# Patient Record
Sex: FEMALE | Race: WHITE | NOT HISPANIC OR LATINO
[De-identification: names, ages, dates, MRNs, and addresses within clinical notes are randomized per-mention and may not be internally consistent; named-entity substitution may affect disease eponyms.]

---

## 2018-05-16 ENCOUNTER — TRANSCRIPTION ENCOUNTER (OUTPATIENT)
Age: 48
End: 2018-05-16

## 2022-01-10 ENCOUNTER — OUTPATIENT (OUTPATIENT)
Dept: OUTPATIENT SERVICES | Facility: HOSPITAL | Age: 52
LOS: 1 days | End: 2022-01-10
Payer: COMMERCIAL

## 2022-01-10 DIAGNOSIS — Z20.828 CONTACT WITH AND (SUSPECTED) EXPOSURE TO OTHER VIRAL COMMUNICABLE DISEASES: ICD-10-CM

## 2022-01-10 LAB — SARS-COV-2 RNA SPEC QL NAA+PROBE: SIGNIFICANT CHANGE UP

## 2022-01-10 PROCEDURE — U0005: CPT

## 2022-01-10 PROCEDURE — C9803: CPT

## 2022-01-10 PROCEDURE — U0003: CPT

## 2022-01-11 DIAGNOSIS — Z20.828 CONTACT WITH AND (SUSPECTED) EXPOSURE TO OTHER VIRAL COMMUNICABLE DISEASES: ICD-10-CM

## 2022-07-26 ENCOUNTER — EMERGENCY (EMERGENCY)
Facility: HOSPITAL | Age: 52
LOS: 0 days | Discharge: HOME | End: 2022-07-26
Attending: EMERGENCY MEDICINE
Payer: COMMERCIAL

## 2022-07-26 VITALS — HEIGHT: 61 IN | WEIGHT: 147.05 LBS

## 2022-07-26 VITALS
DIASTOLIC BLOOD PRESSURE: 78 MMHG | SYSTOLIC BLOOD PRESSURE: 157 MMHG | RESPIRATION RATE: 18 BRPM | HEART RATE: 66 BPM | OXYGEN SATURATION: 100 % | TEMPERATURE: 98 F

## 2022-07-26 DIAGNOSIS — R10.10 UPPER ABDOMINAL PAIN, UNSPECIFIED: ICD-10-CM

## 2022-07-26 DIAGNOSIS — K52.9 NONINFECTIVE GASTROENTERITIS AND COLITIS, UNSPECIFIED: ICD-10-CM

## 2022-07-26 DIAGNOSIS — Z90.49 ACQUIRED ABSENCE OF OTHER SPECIFIED PARTS OF DIGESTIVE TRACT: ICD-10-CM

## 2022-07-26 DIAGNOSIS — K59.00 CONSTIPATION, UNSPECIFIED: ICD-10-CM

## 2022-07-26 DIAGNOSIS — R42 DIZZINESS AND GIDDINESS: ICD-10-CM

## 2022-07-26 DIAGNOSIS — Z90.49 ACQUIRED ABSENCE OF OTHER SPECIFIED PARTS OF DIGESTIVE TRACT: Chronic | ICD-10-CM

## 2022-07-26 LAB
ALBUMIN SERPL ELPH-MCNC: 4 G/DL — SIGNIFICANT CHANGE UP (ref 3.3–5)
ALP SERPL-CCNC: 58 U/L — SIGNIFICANT CHANGE UP (ref 40–120)
ALT FLD-CCNC: 34 U/L — SIGNIFICANT CHANGE UP (ref 12–78)
ANION GAP SERPL CALC-SCNC: 5 MMOL/L — SIGNIFICANT CHANGE UP (ref 5–17)
APPEARANCE UR: CLEAR — SIGNIFICANT CHANGE UP
AST SERPL-CCNC: 17 U/L — SIGNIFICANT CHANGE UP (ref 15–37)
BASOPHILS # BLD AUTO: 0.03 K/UL — SIGNIFICANT CHANGE UP (ref 0–0.2)
BASOPHILS NFR BLD AUTO: 0.5 % — SIGNIFICANT CHANGE UP (ref 0–2)
BILIRUB SERPL-MCNC: 0.5 MG/DL — SIGNIFICANT CHANGE UP (ref 0.2–1.2)
BILIRUB UR-MCNC: NEGATIVE — SIGNIFICANT CHANGE UP
BUN SERPL-MCNC: 15 MG/DL — SIGNIFICANT CHANGE UP (ref 7–23)
CALCIUM SERPL-MCNC: 9.3 MG/DL — SIGNIFICANT CHANGE UP (ref 8.5–10.1)
CHLORIDE SERPL-SCNC: 108 MMOL/L — SIGNIFICANT CHANGE UP (ref 96–108)
CO2 SERPL-SCNC: 27 MMOL/L — SIGNIFICANT CHANGE UP (ref 22–31)
COLOR SPEC: YELLOW — SIGNIFICANT CHANGE UP
CREAT SERPL-MCNC: 0.76 MG/DL — SIGNIFICANT CHANGE UP (ref 0.5–1.3)
DIFF PNL FLD: NEGATIVE — SIGNIFICANT CHANGE UP
EGFR: 95 ML/MIN/1.73M2 — SIGNIFICANT CHANGE UP
EOSINOPHIL # BLD AUTO: 0.02 K/UL — SIGNIFICANT CHANGE UP (ref 0–0.5)
EOSINOPHIL NFR BLD AUTO: 0.3 % — SIGNIFICANT CHANGE UP (ref 0–6)
GLUCOSE SERPL-MCNC: 95 MG/DL — SIGNIFICANT CHANGE UP (ref 70–99)
GLUCOSE UR QL: NEGATIVE — SIGNIFICANT CHANGE UP
HCG SERPL-ACNC: <1 MIU/ML — SIGNIFICANT CHANGE UP
HCT VFR BLD CALC: 43.3 % — SIGNIFICANT CHANGE UP (ref 34.5–45)
HGB BLD-MCNC: 15 G/DL — SIGNIFICANT CHANGE UP (ref 11.5–15.5)
HIV 1 & 2 AB SERPL IA.RAPID: SIGNIFICANT CHANGE UP
IMM GRANULOCYTES NFR BLD AUTO: 0.2 % — SIGNIFICANT CHANGE UP (ref 0–1.5)
KETONES UR-MCNC: ABNORMAL
LEUKOCYTE ESTERASE UR-ACNC: ABNORMAL
LIDOCAIN IGE QN: 197 U/L — SIGNIFICANT CHANGE UP (ref 73–393)
LYMPHOCYTES # BLD AUTO: 2.55 K/UL — SIGNIFICANT CHANGE UP (ref 1–3.3)
LYMPHOCYTES # BLD AUTO: 44.3 % — HIGH (ref 13–44)
MCHC RBC-ENTMCNC: 30.2 PG — SIGNIFICANT CHANGE UP (ref 27–34)
MCHC RBC-ENTMCNC: 34.6 GM/DL — SIGNIFICANT CHANGE UP (ref 32–36)
MCV RBC AUTO: 87.1 FL — SIGNIFICANT CHANGE UP (ref 80–100)
MONOCYTES # BLD AUTO: 0.29 K/UL — SIGNIFICANT CHANGE UP (ref 0–0.9)
MONOCYTES NFR BLD AUTO: 5 % — SIGNIFICANT CHANGE UP (ref 2–14)
NEUTROPHILS # BLD AUTO: 2.86 K/UL — SIGNIFICANT CHANGE UP (ref 1.8–7.4)
NEUTROPHILS NFR BLD AUTO: 49.7 % — SIGNIFICANT CHANGE UP (ref 43–77)
NITRITE UR-MCNC: NEGATIVE — SIGNIFICANT CHANGE UP
PH UR: 5 — SIGNIFICANT CHANGE UP (ref 5–8)
PLATELET # BLD AUTO: 287 K/UL — SIGNIFICANT CHANGE UP (ref 150–400)
POTASSIUM SERPL-MCNC: 4 MMOL/L — SIGNIFICANT CHANGE UP (ref 3.5–5.3)
POTASSIUM SERPL-SCNC: 4 MMOL/L — SIGNIFICANT CHANGE UP (ref 3.5–5.3)
PROT SERPL-MCNC: 7.8 GM/DL — SIGNIFICANT CHANGE UP (ref 6–8.3)
PROT UR-MCNC: NEGATIVE — SIGNIFICANT CHANGE UP
RBC # BLD: 4.97 M/UL — SIGNIFICANT CHANGE UP (ref 3.8–5.2)
RBC # FLD: 12 % — SIGNIFICANT CHANGE UP (ref 10.3–14.5)
SODIUM SERPL-SCNC: 140 MMOL/L — SIGNIFICANT CHANGE UP (ref 135–145)
SP GR SPEC: 1.01 — SIGNIFICANT CHANGE UP (ref 1.01–1.02)
UROBILINOGEN FLD QL: NEGATIVE — SIGNIFICANT CHANGE UP
WBC # BLD: 5.76 K/UL — SIGNIFICANT CHANGE UP (ref 3.8–10.5)
WBC # FLD AUTO: 5.76 K/UL — SIGNIFICANT CHANGE UP (ref 3.8–10.5)

## 2022-07-26 PROCEDURE — 85025 COMPLETE CBC W/AUTO DIFF WBC: CPT

## 2022-07-26 PROCEDURE — 86703 HIV-1/HIV-2 1 RESULT ANTBDY: CPT

## 2022-07-26 PROCEDURE — 74177 CT ABD & PELVIS W/CONTRAST: CPT | Mod: 26,MA

## 2022-07-26 PROCEDURE — 99284 EMERGENCY DEPT VISIT MOD MDM: CPT | Mod: 25

## 2022-07-26 PROCEDURE — 84702 CHORIONIC GONADOTROPIN TEST: CPT

## 2022-07-26 PROCEDURE — 81001 URINALYSIS AUTO W/SCOPE: CPT

## 2022-07-26 PROCEDURE — 87086 URINE CULTURE/COLONY COUNT: CPT

## 2022-07-26 PROCEDURE — 99285 EMERGENCY DEPT VISIT HI MDM: CPT

## 2022-07-26 PROCEDURE — 80053 COMPREHEN METABOLIC PANEL: CPT

## 2022-07-26 PROCEDURE — 83690 ASSAY OF LIPASE: CPT

## 2022-07-26 PROCEDURE — 36415 COLL VENOUS BLD VENIPUNCTURE: CPT

## 2022-07-26 PROCEDURE — 74177 CT ABD & PELVIS W/CONTRAST: CPT | Mod: MA

## 2022-07-26 RX ORDER — SODIUM CHLORIDE 9 MG/ML
1000 INJECTION INTRAMUSCULAR; INTRAVENOUS; SUBCUTANEOUS ONCE
Refills: 0 | Status: COMPLETED | OUTPATIENT
Start: 2022-07-26 | End: 2022-07-26

## 2022-07-26 RX ADMIN — SODIUM CHLORIDE 2000 MILLILITER(S): 9 INJECTION INTRAMUSCULAR; INTRAVENOUS; SUBCUTANEOUS at 14:11

## 2022-07-26 NOTE — ED STATDOCS - CPE ED NEURO NORM
normal... ,[email or web address]

## 2022-07-26 NOTE — ED ADULT TRIAGE NOTE - CHIEF COMPLAINT QUOTE
Patient comes to ED for generalized abdominal pain. Patient denies any nausea vomiting or diarrhea. Patient reports similar pain happened a week ago and resolved and pain came back this afternoon,

## 2022-07-26 NOTE — ED STATDOCS - PATIENT PORTAL LINK FT
You can access the FollowMyHealth Patient Portal offered by [facility] by registering at the following website: [email or web address]. By joining [employer]’s FollowMyHealth portal, you will also be able to view your health information using other applications (apps) compatible with our system.

## 2022-07-26 NOTE — ED STATDOCS - OBJECTIVE STATEMENT
73 y/o female w/ a PMHx of cholecystectomy presents to the ED c/o upper abd pain. Pt also c/o lightheadedness Denies n/v or urinary Sx. Pt states pain is currently improved. Pt also reports she had a similar pain x1 week ago which self resolved. LMP x2 months ago. Nonsmoker. Pt doesn't think she could be pregnant. Denies fevers.

## 2022-07-26 NOTE — ED STATDOCS - NSFOLLOWUPINSTRUCTIONS_ED_ALL_ED_FT
Enteritis    WHAT YOU NEED TO KNOW:    Enteritis is inflammation of the small intestine. It may be caused by eating foods or drinking liquids contaminated with a virus, bacteria, or parasites. It may also be caused by certain medicines, damage from radiation, and medical conditions such as Crohn disease.     DISCHARGE INSTRUCTIONS:    Seek care immediately if:   •You cannot stop vomiting.      •You have not urinated for 12 hours.       Contact your healthcare provider if:   •You have a fever over 101.5.       •You have blood or mucus in your bowel movements.       •You continue to vomit or have diarrhea for more than 3 days, even after treatment.      •You have a dry mouth and eyes, you are urinating less than usual, and you feel dizzy when you stand up.       •Your mouth or eyes are dry. You are not urinating as much or as often.      •You are losing weight without trying.      •You have questions or concerns about your condition or care.      Medicines:   •Medicines may be given to fight an infection caused by bacteria or a parasite. You may also need medicines to slow or stop your diarrhea or vomiting. Do not take these medicines unless your healthcare provider say it is okay. Other medicines may be needed to treat medical conditions that are causing enteritis.       •Take your medicine as directed. Contact your healthcare provider if you think your medicine is not helping or if you have side effects. Tell him or her if you are allergic to any medicine. Keep a list of the medicines, vitamins, and herbs you take. Include the amounts, and when and why you take them. Bring the list or the pill bottles to follow-up visits. Carry your medicine list with you in case of an emergency.      Manage enteritis:   •Eat foods that help to decrease symptoms. Limit or avoid foods and liquids that are high in sugar, fat, and fiber to help relieve diarrhea. It may be helpful to avoid lactose. Lactose is a type of sugar that is found in milk products. You may be able to tolerate soups, broths, well-cooked vegetables, canned fruit, and baked or broiled meats. Ask your dietitian or healthcare provider if you should follow a special diet. You may need to avoid other foods if you have certain medical conditions such as celiac disease.       •Drink liquids as directed. Ask how much liquid to drink each day and which liquids are best for you. It is important to prevent or treat dehydration. Even if you have been vomiting, suck on ice chips or take small sips of clear liquids often. Slowly increase the amount of clear liquids you drink. If you become dehydrated, you may need IV liquids.      •Drink an oral rehydration solution (ORS) as directed. An ORS contains water, salts, and sugar that are needed to replace lost body fluids. Ask what kind of ORS to use, how much to drink, and where to get it.      Prevent enteritis: Enteritis that is caused by bacteria, parasites, or viruses can be prevented. The following may help to prevent this type of enteritis:  •Wash your hands often. Use soap and water. Wash your hands after you use the bathroom, change a child's diapers, or sneeze. Wash your hands before you prepare or eat food.   Handwashing           •Clean surfaces and do laundry often. Wash your clothes and towels separately from the rest of the laundry. Clean surfaces in your home with antibacterial  or bleach.      •Clean food thoroughly and cook safely. Wash raw vegetables before you cook. Cook meat, fish, and eggs fully. Do not use the same dishes for raw meat as you do for other foods. Refrigerate any leftover food immediately.      •Be aware when you camp or travel. Drink only clean water. Do not drink from rivers or lakes unless you purify or boil the water first. When you travel, drink bottled water and do not add ice. Do not eat fruit that has not been peeled. Do not eat raw fish or meat that is not fully cooked.       Follow up with your doctor as directed: Write down your questions so you remember to ask them during your visits.

## 2022-07-26 NOTE — ED STATDOCS - PROGRESS NOTE DETAILS
75 yo female with a PSH of cholecystectomy presents with upper abdominal pain. Pt had the pain last week which resolved after a few days. The pain returned this morning with lightheadedness. Denies n/v/d/f, urinary complaints. Will check labs, CT, UA and reeval. -Mynor Marin PA-C Discussed results with pt including te enteritis and stool burden. advised that fluids and nsaids are the treatment for enteritis and to make sure to continue to eat fruits (except bananas), vegetables to prevent constipation. Pt aware. Will give GI referral for further evaluation. Pt aware and agrees with plan. -Mynor Marin PA-C

## 2022-07-26 NOTE — ED STATDOCS - ATTENDING APP SHARED VISIT CONTRIBUTION OF CARE
I, Heath Ron MD,  performed the initial face to face bedside interview with this patient regarding history of present illness, review of symptoms and relevant past medical, social and family history.  I completed an independent physical examination.  I was the initial provider who evaluated this patient.   I personally saw the patient and performed a substantive portion of the visit including all aspects of the medical decision making.  I have signed out the follow up of any pending tests (i.e. labs, radiological studies) to the JOSE.  I have communicated the patient’s plan of care and disposition with the JOSE.  The history, relevant review of systems, past medical and surgical history, medical decision making, and physical examination was documented by the scribe in my presence and I attest to the accuracy of the documentation.

## 2022-07-26 NOTE — ED STATDOCS - CARE PROVIDER_API CALL
Dane Marie  GASTROENTEROLOGY  [address], [address]  [place] [address]  Phone: [phone]  Fax: [phone]  Follow Up Time:

## 2022-07-27 LAB
CULTURE RESULTS: SIGNIFICANT CHANGE UP
SPECIMEN SOURCE: SIGNIFICANT CHANGE UP

## 2023-10-30 PROBLEM — Z78.9 OTHER SPECIFIED HEALTH STATUS: Chronic | Status: ACTIVE | Noted: 2022-07-28

## 2023-11-13 ENCOUNTER — LABORATORY RESULT (OUTPATIENT)
Age: 53
End: 2023-11-13

## 2023-11-13 ENCOUNTER — APPOINTMENT (OUTPATIENT)
Dept: OBGYN | Facility: CLINIC | Age: 53
End: 2023-11-13
Payer: COMMERCIAL

## 2023-11-13 VITALS
BODY MASS INDEX: 24.55 KG/M2 | HEIGHT: 61 IN | WEIGHT: 130 LBS | SYSTOLIC BLOOD PRESSURE: 115 MMHG | DIASTOLIC BLOOD PRESSURE: 76 MMHG

## 2023-11-13 DIAGNOSIS — Z80.0 FAMILY HISTORY OF MALIGNANT NEOPLASM OF DIGESTIVE ORGANS: ICD-10-CM

## 2023-11-13 DIAGNOSIS — Z80.3 FAMILY HISTORY OF MALIGNANT NEOPLASM OF BREAST: ICD-10-CM

## 2023-11-13 DIAGNOSIS — Z00.00 ENCOUNTER FOR GENERAL ADULT MEDICAL EXAMINATION W/OUT ABNORMAL FINDINGS: ICD-10-CM

## 2023-11-13 DIAGNOSIS — Z78.9 OTHER SPECIFIED HEALTH STATUS: ICD-10-CM

## 2023-11-13 PROCEDURE — 99386 PREV VISIT NEW AGE 40-64: CPT | Mod: 1L

## 2023-11-13 PROCEDURE — 82270 OCCULT BLOOD FECES: CPT

## 2023-11-13 PROCEDURE — 99202 OFFICE O/P NEW SF 15 MIN: CPT

## 2023-11-21 ENCOUNTER — NON-APPOINTMENT (OUTPATIENT)
Age: 53
End: 2023-11-21

## 2023-12-01 ENCOUNTER — ASOB RESULT (OUTPATIENT)
Age: 53
End: 2023-12-01

## 2023-12-01 ENCOUNTER — APPOINTMENT (OUTPATIENT)
Dept: ANTEPARTUM | Facility: CLINIC | Age: 53
End: 2023-12-01
Payer: COMMERCIAL

## 2023-12-01 PROCEDURE — 76830 TRANSVAGINAL US NON-OB: CPT

## 2023-12-01 PROCEDURE — 76856 US EXAM PELVIC COMPLETE: CPT | Mod: 59

## 2023-12-15 ENCOUNTER — APPOINTMENT (OUTPATIENT)
Dept: OBGYN | Facility: CLINIC | Age: 53
End: 2023-12-15
Payer: COMMERCIAL

## 2023-12-15 VITALS
DIASTOLIC BLOOD PRESSURE: 81 MMHG | BODY MASS INDEX: 24.55 KG/M2 | WEIGHT: 130 LBS | HEIGHT: 61 IN | SYSTOLIC BLOOD PRESSURE: 133 MMHG

## 2023-12-15 PROCEDURE — 99212 OFFICE O/P EST SF 10 MIN: CPT

## 2023-12-15 NOTE — DISCUSSION/SUMMARY
[FreeTextEntry1] : 54yo  PMF with cystocele & broad based cervical polyp.   Osteopenia:  - Encouraged daily weight bearing exercise, Ca2+ rich diet, continue Vit D as recommended by PCP  - Repeat test in 2 yrs   Cervical polyp:  - Will need LEEP procedure in [facility] in   - Aware she cannot have anything in vagina for 2 wks post procedure or be submerged in water  - All questions were solicited and answered   cystocele:  - Given info to see Dr Craig today   Fibroid:  - Repeat TVUS in 1 yr; sooner PRN   Taal Wiley MD  Obstetrician/Gynecologist

## 2023-12-15 NOTE — HISTORY OF PRESENT ILLNESS
[FreeTextEntry1] : 54yo  PMF with cystocele is here for cervical polypectomy and results review. Her pap and HPV are wnl. Her sono demonstrates a 3mm EMS, q 1cm fibroid, a normal R ov and non-visualized L ov. She had a DEXA which demonstrates osteopenia.

## 2024-01-03 ENCOUNTER — RESULT CHARGE (OUTPATIENT)
Age: 54
End: 2024-01-03

## 2024-01-04 ENCOUNTER — APPOINTMENT (OUTPATIENT)
Dept: UROGYNECOLOGY | Facility: CLINIC | Age: 54
End: 2024-01-04
Payer: COMMERCIAL

## 2024-01-04 DIAGNOSIS — N95.2 POSTMENOPAUSAL ATROPHIC VAGINITIS: ICD-10-CM

## 2024-01-04 DIAGNOSIS — N84.1 POLYP OF CERVIX UTERI: ICD-10-CM

## 2024-01-04 DIAGNOSIS — K59.09 OTHER CONSTIPATION: ICD-10-CM

## 2024-01-04 PROCEDURE — 57500 BIOPSY OF CERVIX: CPT | Mod: 59

## 2024-01-04 PROCEDURE — 99204 OFFICE O/P NEW MOD 45 MIN: CPT | Mod: 25

## 2024-01-04 PROCEDURE — 99459 PELVIC EXAMINATION: CPT

## 2024-01-04 PROCEDURE — 81003 URINALYSIS AUTO W/O SCOPE: CPT | Mod: QW

## 2024-01-04 RX ORDER — ESTRADIOL 0.1 MG/G
0.1 CREAM VAGINAL
Qty: 1 | Refills: 3 | Status: ACTIVE | COMMUNITY
Start: 2024-01-04 | End: 1900-01-01

## 2024-01-04 NOTE — ADDENDUM
[FreeTextEntry1] : This note was written by Jenny Benedict, acting as the  for Dr. Craig. This note accurately reflects the work and decisions made by Dr. Craig.

## 2024-01-04 NOTE — OB HISTORY
[Total Preg ___] : : [unfilled] [Vaginal ___] : [unfilled] vaginal delivery(s) [Last Pap Smear ___] : date of last pap smear was on [unfilled] [Sexually Active] : sexually active [Definite ___ (Date)] : the last menstrual period was [unfilled] [Abnormal Pap Smear] : normal pap smear [Taking Estrogens] : is not taking estrogen replacement

## 2024-01-04 NOTE — PROCEDURE
[Straight Catheterization] : insertion of a straight catheter [Patient] : the patient [___ Fr Straight Tip] : a [unfilled] in [place] straight tip catheter [None] : there were no complications with the catheter insertion [Clear] : clear [No Complications] : no complications [Tolerated Well] : the patient tolerated the procedure well [Post procedure instructions and information given] : Post procedure instructions and information were given and reviewed with patient. [0] : 0 [Other ___] : [unfilled] [Culture] : culture [Urinalysis] : urinalysis [FreeTextEntry1] : Excision of cervical polyp: Bilobed polyp was noted on involving the posterior lip of the cervix at 12 o'clock position.  The polyp was grasped with a ring forceps and was excised without difficulty. Specimen sent to pathology

## 2024-01-04 NOTE — PHYSICAL EXAM
[Chaperone Present] : A chaperone was present in the examining room during all aspects of the physical examination [FreeTextEntry1] : General: Not in acute distress, alert and oriented x3.   Neck: Supple. No lymphadenopathy.  Abdomen: Soft, nontender, and nondistended. No obvious hepatosplenomegaly. No obvious hernias.   Pelvic Exam: Normal external female genitalia. Saddle sensory exam S2 to S4 is intact. Perineal reflexes not visualized. Urethra is hypermobile without prolapse, exudates, or lesions. Cough stress test is negative with and without anterior vaginal wall reduction. Post void residual was checked with I/O cath and was 200 cc of clear urine. Pale and mildly atrophic-appearing vaginal epithelium. No vaginal blood or discharge. Cervix with a posterior lip polyp which was removed without difficulty.  Hemostasis was achieved with silver nitrate.. Bimanual exam reveals a small uterus in normal positioning. No palpable adnexal masses or tenderness. . Rectovaginal exam: Moderate size perineal pocket present without presence of stool. No enterocele or rectal prolapse appreciated.  Decreased resting anal and sphincter tone.   POPQ: Aa +1, Ba +1, C -3, TVL 9, D-4, GH 6, PB 4.5, Ap -1., Bp -1 ballooning of the whole perineum noted with Valsalva.

## 2024-01-04 NOTE — ASSESSMENT
[FreeTextEntry1] : Patient is a 53 year old mutipara who presents with worsening symptoms of  stage II pelvic organ prolapse mainly involving the anterior vaginal wall and cervix, intermittent voiding and defecatory dysfunction for past 2 years.  On exam, she has a mildly elevated postvoid residual of 200 cc, hypermobile urethra and a negative cough stress test.  Patient reports significant bother as well as embarrassment from the prolapse.  She is getting  in March and would like to take care of the prolapse as soon as possible.  Potential predisposing factors include chronic constipation and straining for bowel movements.

## 2024-01-04 NOTE — DISCUSSION/SUMMARY
[FreeTextEntry1] : Patient was counseled regarding evaluation and management of pelvic organ prolapse, incomplete bladder emptying, atrophic vaginitis as well as chronic constipation following management plan was outlined after detailed discussion with patient.  [] Discussed management options for pelvic organ prolapse including pessary as well as surgery.  She is not interested in pessary she would like to undergo definite surgical management.  We discussed pros and cons of robot-assisted supracervical hysterectomy with sacrocolpopexy versus vaginal hysterectomy, uterosacral ligament suspension, anterior and posterior repair.  She is young with a advanced stage prolapse involving the anterior vaginal wall.  Based on the available data, I explained to her that the sacrocolpopexy offers the lowest risk of recurrent prolapse.  We did discuss potential risk of either approaches such as infection, bleeding, injury to surrounding organs such as bladder and ureter as well as the mesh related complications associate with sacrocolpopexy.  After detailed discussion, she would like to proceed with sacrocolpopexy after she returns from her [facility] in April.  We discussed bilateral salpingectomy with ovarian preservation preservation.  She would like to undergo bilateral salpingectomy at the time of hysterectomy.  Patient has no history of abnormal Pap smears or postmenopausal bleeding.  Her recent pelvic ultrasound showed normal endometrial thickness and absence of adnexal mass [] Recommended urodynamic testing for further evaluation of incomplete bladder emptying as well as screening for stress urinary incontinence.  She has no evidence of voiding dysfunction on urodynamic testing and there is stress urinary incontinence, will recommend concomitant retropubic mid urethral sling placement. [] Urine culture ordered on cath urine specimen.  If there is evidence of urinary tract infection, will recommend antibiotic such as Keflex 500 mg p.o. twice daily for 7 days or nitrofurantoin 100 mg p.o. twice daily for 7 days [] She reports bothersome vaginal dryness.  Recommended vaginal estradiol cream 0.1 mg/g.  She was advised to use a gram of the cream per vagina every night at bedtime for 2 weeks followed by 1 g of the cream per vagina 2-3 times per week for 4 to 6 months.  Also recommended use of organic coconut oil as a lubricant and moisturizer [] Cervical polyp was sent for pathology. [] Follow-up in 1 month [] OR booking sheet submitted

## 2024-01-04 NOTE — HISTORY OF PRESENT ILLNESS
[FreeTextEntry1] : Patient is a 53 year para 3 ( x3) who is referred by Dr. Wiley for evaluation and management of possible prolapse. Reports she has been able to feel a ball of tissue dropping from her vagina for about 2 years. Reports worsening of symptoms. Reports difficulty with intercourse. Bulge is most bothersome with exercise, she avoids straining or tightening exercises. States she has tried Kegels and PFE but states it has not helped symptoms. Reports difficulty evacuating bowel due to bulge. Manually reduces bulge to defecate or prior to intercourse. Reports urinary urgency in the mornings but denies any urine leakage accidents. Always makes it to the bathroom in time. Denies any use of estrogen cream or lubricants. Undergoing polyp LEEP 2023 by Dr. Wiley, discovered last physical exam.   I reviewed patient's lab as well as her recent pelvic ultrasound.  Her Pap smear from 2023 showed normal cervical cytology with negative high-risk HPV testing  Her pelvic ultrasound from 2023 showed 8 cm uterus with 1 cm anterior fibroid.  Normal endometrial thickness of 3 mm, cervical polyp and normal adnexa   Daytime frequency: Yes  Nocturia: Yes, proportional to increased water intake Urinary urgency: Yes  Leakage of urine with urgency: No  Leakage of urine with coughing sneezing laughing: No  Sensation of incomplete bladder emptying: No  History of frequent urinary tract infections: No  History of hematuria: No    Daily fluid intake: Water. Previous treatments: None  Vaginal symptoms: Dryness. Difficulty with intercourse.  Bowel symptoms: Incomplete emptying. Splinting.     GYN: LMP 2022, Negative pap 2023, Negative HPV testing, Normal mammos, Denies postmenopausal bleeding PMH: Anemia PSH: Cholecystectomy, Tubal Ligation  Allx: NKDA

## 2024-01-05 LAB
APPEARANCE: CLEAR
BACTERIA: NEGATIVE /HPF
BILIRUBIN URINE: NEGATIVE
BLOOD URINE: NEGATIVE
CAST: 0 /LPF
COLOR: YELLOW
EPITHELIAL CELLS: 0 /HPF
GLUCOSE QUALITATIVE U: NEGATIVE MG/DL
KETONES URINE: NEGATIVE MG/DL
LEUKOCYTE ESTERASE URINE: NEGATIVE
MICROSCOPIC-UA: NORMAL
NITRITE URINE: NEGATIVE
PH URINE: 7
PROTEIN URINE: NEGATIVE MG/DL
RED BLOOD CELLS URINE: 0 /HPF
SPECIFIC GRAVITY URINE: 1.01
UROBILINOGEN URINE: 0.2 MG/DL
WHITE BLOOD CELLS URINE: 0 /HPF

## 2024-01-08 LAB — BACTERIA UR CULT: NORMAL

## 2024-01-09 ENCOUNTER — APPOINTMENT (OUTPATIENT)
Dept: OBGYN | Facility: CLINIC | Age: 54
End: 2024-01-09

## 2024-01-10 LAB — CORE LAB BIOPSY: NORMAL

## 2024-02-15 ENCOUNTER — RESULT CHARGE (OUTPATIENT)
Age: 54
End: 2024-02-15

## 2024-02-16 ENCOUNTER — APPOINTMENT (OUTPATIENT)
Dept: UROGYNECOLOGY | Facility: CLINIC | Age: 54
End: 2024-02-16
Payer: COMMERCIAL

## 2024-02-16 DIAGNOSIS — N36.8 OTHER SPECIFIED DISORDERS OF URETHRA: ICD-10-CM

## 2024-02-16 DIAGNOSIS — R35.1 NOCTURIA: ICD-10-CM

## 2024-02-16 PROCEDURE — 51728 CYSTOMETROGRAM W/VP: CPT

## 2024-02-16 PROCEDURE — 51784 ANAL/URINARY MUSCLE STUDY: CPT

## 2024-02-16 PROCEDURE — 51797 INTRAABDOMINAL PRESSURE TEST: CPT

## 2024-02-16 PROCEDURE — 51741 ELECTRO-UROFLOWMETRY FIRST: CPT

## 2024-02-16 RX ORDER — PHENAZOPYRIDINE 200 MG/1
200 TABLET, FILM COATED ORAL 3 TIMES DAILY
Qty: 15 | Refills: 0 | Status: ACTIVE | COMMUNITY
Start: 2024-02-16 | End: 1900-01-01

## 2024-02-16 NOTE — DISCUSSION/SUMMARY
[FreeTextEntry1] : I discussed the findings of urodynamic testing.  She has no complaints of leaking with activities on a day-to-day basis.  In addition her urodynamic testing is negative for evidence of a stress urinary incontinence with prolapse reduction in different positions 
all other ROS negative except as per HPI

## 2024-02-16 NOTE — HISTORY OF PRESENT ILLNESS
[FreeTextEntry1] : Patient is a 53 year old mutipara who presents with worsening symptoms of stage II pelvic organ prolapse mainly involving the anterior vaginal wall and cervix, intermittent voiding and defecatory dysfunction for past 2 years. On previous office exam, she has a mildly elevated postvoid residual of 200 cc, hypermobile urethra and a negative cough stress test. Patient reports significant bother as well as embarrassment from the prolapse. She is getting  in March and would like to take care of the prolapse as soon as possible. Potential predisposing factors include chronic constipation and straining for bowel movements.  Patient presented for urodynamic testing on 2/16/2024.  Her urodynamic test findings include an interrupted uroflow with a voided volume of 270 cc, postvoid residual of 180 cc, max flow rate of 18, average flow rate of 8, and flow time of 34 seconds; her complex cystogram showed normal sensation, cystometric capacity of 400 cc, normal compliance, absence of detrusor overactivity during filling and absence of stress urinary incontinence with and without prolapse reduction, with and without catheter removal in sitting, standing and squat positions.  Patient voided 467 cc at the end of the study with max flow rate of 21, and flow time of 59 seconds.  The mechanism of the void clinic could not be determined due to lack of pressure transducers Assessment and plan:

## 2024-02-16 NOTE — ASSESSMENT
[FreeTextEntry1] : The above test findings are consistent with normal sensation, normal cystometric capacity, normal compliance, absence of detrusor overactivity, absence of stress urinary incontinence and absence of urinary retention.  I will discuss above findings with the patient on follow-up visit we discussed pros and cons of prophylactic sling placement at the time of prolapse surgery

## 2024-02-17 LAB
APPEARANCE: CLEAR
BACTERIA: NEGATIVE /HPF
BILIRUBIN URINE: NEGATIVE
BLOOD URINE: NEGATIVE
CAST: 0 /LPF
COLOR: YELLOW
EPITHELIAL CELLS: 0 /HPF
GLUCOSE QUALITATIVE U: NEGATIVE MG/DL
KETONES URINE: NEGATIVE MG/DL
LEUKOCYTE ESTERASE URINE: NEGATIVE
MICROSCOPIC-UA: NORMAL
NITRITE URINE: NEGATIVE
PH URINE: 6
PROTEIN URINE: NEGATIVE MG/DL
RED BLOOD CELLS URINE: 0 /HPF
SPECIFIC GRAVITY URINE: 1.01
UROBILINOGEN URINE: 0.2 MG/DL
WHITE BLOOD CELLS URINE: 0 /HPF

## 2024-02-19 LAB — BACTERIA UR CULT: NORMAL

## 2024-03-06 PROBLEM — N81.10 PROLAPSE OF ANTERIOR VAGINAL WALL: Status: ACTIVE | Noted: 2024-01-04

## 2024-03-06 PROBLEM — N81.6 POSTERIOR VAGINAL WALL PROLAPSE: Status: ACTIVE | Noted: 2024-01-04

## 2024-03-06 PROBLEM — R39.15 URGENCY OF URINATION: Status: ACTIVE | Noted: 2024-01-04

## 2024-03-06 PROBLEM — N81.2 INCOMPLETE UTEROVAGINAL PROLAPSE: Status: ACTIVE | Noted: 2024-01-04

## 2024-03-07 ENCOUNTER — NON-APPOINTMENT (OUTPATIENT)
Age: 54
End: 2024-03-07

## 2024-03-07 ENCOUNTER — APPOINTMENT (OUTPATIENT)
Dept: UROGYNECOLOGY | Facility: CLINIC | Age: 54
End: 2024-03-07
Payer: COMMERCIAL

## 2024-03-07 VITALS
SYSTOLIC BLOOD PRESSURE: 126 MMHG | DIASTOLIC BLOOD PRESSURE: 70 MMHG | BODY MASS INDEX: 24.55 KG/M2 | HEIGHT: 61 IN | WEIGHT: 130 LBS

## 2024-03-07 DIAGNOSIS — R39.15 URGENCY OF URINATION: ICD-10-CM

## 2024-03-07 DIAGNOSIS — N81.10 CYSTOCELE, UNSPECIFIED: ICD-10-CM

## 2024-03-07 DIAGNOSIS — N81.2 INCOMPLETE UTEROVAGINAL PROLAPSE: ICD-10-CM

## 2024-03-07 DIAGNOSIS — N81.6 RECTOCELE: ICD-10-CM

## 2024-03-07 PROCEDURE — 99214 OFFICE O/P EST MOD 30 MIN: CPT

## 2024-03-07 NOTE — ASSESSMENT
[FreeTextEntry1] : Patient is a 54 year old mutipara who presents with worsening symptoms of stage II pelvic organ prolapse mainly involving the anterior vaginal wall and cervix, intermittent voiding and defecatory dysfunction for past 2 years whose urodynamic test findings are consistent with normal sensation, normal cystometric capacity, normal compliance, absence of detrusor overactivity, absence of stress urinary incontinence and absence of urinary retention.  Patient denies any concerns for stress urinary incontinence

## 2024-03-07 NOTE — DISCUSSION/SUMMARY
[FreeTextEntry1] : Reviewed the findings of urodynamic testing. She has no complaints of leaking with activities on a day-to-day basis. In addition her urodynamic testing is negative for evidence of a stress urinary incontinence with prolapse reduction in different positions.  Discussed pros and cons of  concomitant retropubic mid urethral sling placement.  After detailed discussion, patient would like to take a staged approach.  She is comfortable with the fact that she is at high risk of developing de madi postoperative stress urinary incontinence and may require intervention for stress urinary incontinence in future in the form of mid urethral sling procedure.  I provided a brochure regarding the sling procedure to her.  She will review it and if she changes her mind she will let me know.   Discussed management options for pelvic organ prolapse including pessary as well as surgery. She is not interested in pessary she would like to undergo definite surgical management. We discussed pros and cons of robot-assisted supracervical hysterectomy with sacrocolpopexy versus vaginal hysterectomy, uterosacral ligament suspension, anterior and posterior repair. She is young with a advanced stage prolapse involving the anterior vaginal wall. Based on the available data, I explained to her that the sacrocolpopexy offers the lowest risk of recurrent prolapse. We did discuss potential risk of either approaches such as infection, bleeding, injury to surrounding organs such as bladder and ureter as well as the mesh related complications associate with sacrocolpopexy. After detailed discussion, she would like to proceed with sacrocolpopexy [facility]. We discussed bilateral salpingectomy with ovarian preservation preservation. She would like to undergo bilateral salpingectomy at the time of supracervical hysterectomy. Patient has no history of abnormal Pap smears or postmenopausal bleeding. Her recent pelvic ultrasound showed normal endometrial thickness and absence of adnexal mass.  Patient is comfortable with proceeding with robot-assisted supracervical hysterectomy, bilateral salpingectomy, sacrocolpopexy and cystoscopy in October.  All questions were answered to her satisfaction.

## 2024-03-07 NOTE — HISTORY OF PRESENT ILLNESS
[FreeTextEntry1] : Patient is a 54 year old mutipara who presents with worsening symptoms of stage II pelvic organ prolapse mainly involving the anterior vaginal wall and cervix, intermittent voiding and defecatory dysfunction for past 2 years. On previous office exam, she has a mildly elevated postvoid residual of 200 cc, hypermobile urethra and a negative cough stress test. Patient reports significant bother as well as embarrassment from the prolapse. She is getting  in March and would like to take care of the prolapse as soon as possible. Potential predisposing factors include chronic constipation and straining for bowel movements. Patient presented for urodynamic testing on 2/16/2024. Her urodynamic test findings include an interrupted uroflow with a voided volume of 270 cc, postvoid residual of 180 cc, max flow rate of 18, average flow rate of 8, and flow time of 34 seconds; her complex cystogram showed normal sensation, cystometric capacity of 400 cc, normal compliance, absence of detrusor overactivity during filling and absence of stress urinary incontinence with and without prolapse reduction, with and without catheter removal in sitting, standing and squat positions. Patient voided 467 cc at the end of the study with max flow rate of 21, and flow time of 59 seconds. The mechanism of the void clinic could not be determined due to lack of pressure transducers.  Patient presents today for follow-up.  She states that she had no concerns for urine leakage..  On a rare occasion if she was laughing hysterically and had to use the bathroom she would have leaked urine but this is not a day-to-day issue for her

## 2024-03-08 LAB
APPEARANCE: CLEAR
BACTERIA: NEGATIVE /HPF
BILIRUBIN URINE: NEGATIVE
BLOOD URINE: NEGATIVE
CAST: 0 /LPF
COLOR: YELLOW
EPITHELIAL CELLS: 6 /HPF
GLUCOSE QUALITATIVE U: NEGATIVE MG/DL
KETONES URINE: NEGATIVE MG/DL
LEUKOCYTE ESTERASE URINE: ABNORMAL
MICROSCOPIC-UA: NORMAL
NITRITE URINE: NEGATIVE
PH URINE: 6
PROTEIN URINE: NEGATIVE MG/DL
RED BLOOD CELLS URINE: 0 /HPF
REVIEW: NORMAL
SPECIFIC GRAVITY URINE: 1.02
UROBILINOGEN URINE: 0.2 MG/DL
WHITE BLOOD CELLS URINE: 0 /HPF

## 2024-03-11 LAB — BACTERIA UR CULT: NORMAL

## 2024-10-25 ENCOUNTER — RESULT REVIEW (OUTPATIENT)
Age: 54
End: 2024-10-25

## 2024-10-25 ENCOUNTER — APPOINTMENT (OUTPATIENT)
Dept: UROGYNECOLOGY | Facility: CLINIC | Age: 54
End: 2024-10-25

## 2024-10-25 PROCEDURE — 58542 LSH W/T/O UT 250 G OR LESS: CPT

## 2024-10-25 PROCEDURE — 57425 LAPAROSCOPY SURG COLPOPEXY: CPT

## 2024-11-01 ENCOUNTER — APPOINTMENT (OUTPATIENT)
Dept: UROGYNECOLOGY | Facility: CLINIC | Age: 54
End: 2024-11-01

## 2024-11-01 ENCOUNTER — APPOINTMENT (OUTPATIENT)
Dept: UROGYNECOLOGY | Facility: CLINIC | Age: 54
End: 2024-11-01
Payer: COMMERCIAL

## 2024-11-01 DIAGNOSIS — R30.0 DYSURIA: ICD-10-CM

## 2024-11-01 DIAGNOSIS — Z90.710 ACQUIRED ABSENCE OF BOTH CERVIX AND UTERUS: ICD-10-CM

## 2024-11-01 PROCEDURE — 99024 POSTOP FOLLOW-UP VISIT: CPT

## 2024-11-05 LAB
APPEARANCE: CLEAR
BACTERIA: NEGATIVE /HPF
BILIRUBIN URINE: NEGATIVE
BLOOD URINE: NEGATIVE
CAST: 0 /LPF
COLOR: YELLOW
EPITHELIAL CELLS: 0 /HPF
GLUCOSE QUALITATIVE U: NEGATIVE MG/DL
KETONES URINE: NEGATIVE MG/DL
LEUKOCYTE ESTERASE URINE: NEGATIVE
MICROSCOPIC-UA: NORMAL
NITRITE URINE: NEGATIVE
PH URINE: 7.5
PROTEIN URINE: NEGATIVE MG/DL
RED BLOOD CELLS URINE: 0 /HPF
SPECIFIC GRAVITY URINE: 1.01
UROBILINOGEN URINE: 0.2 MG/DL
WHITE BLOOD CELLS URINE: 0 /HPF

## 2024-11-06 LAB — BACTERIA UR CULT: NORMAL

## 2024-12-16 ENCOUNTER — APPOINTMENT (OUTPATIENT)
Dept: OBGYN | Facility: CLINIC | Age: 54
End: 2024-12-16

## 2024-12-23 ENCOUNTER — APPOINTMENT (OUTPATIENT)
Dept: UROGYNECOLOGY | Facility: CLINIC | Age: 54
End: 2024-12-23

## 2024-12-23 ENCOUNTER — APPOINTMENT (OUTPATIENT)
Dept: UROGYNECOLOGY | Facility: CLINIC | Age: 54
End: 2024-12-23
Payer: COMMERCIAL

## 2024-12-23 PROCEDURE — 99024 POSTOP FOLLOW-UP VISIT: CPT

## 2024-12-24 LAB
APPEARANCE: CLEAR
BACTERIA: NEGATIVE /HPF
BILIRUBIN URINE: NEGATIVE
BLOOD URINE: NEGATIVE
CAST: 0 /LPF
COLOR: YELLOW
EPITHELIAL CELLS: 0 /HPF
GLUCOSE QUALITATIVE U: NEGATIVE MG/DL
KETONES URINE: NEGATIVE MG/DL
LEUKOCYTE ESTERASE URINE: NEGATIVE
MICROSCOPIC-UA: NORMAL
NITRITE URINE: NEGATIVE
PH URINE: 5.5
PROTEIN URINE: NEGATIVE MG/DL
RED BLOOD CELLS URINE: 1 /HPF
SPECIFIC GRAVITY URINE: 1.02
UROBILINOGEN URINE: 0.2 MG/DL
WHITE BLOOD CELLS URINE: 0 /HPF

## 2024-12-25 PROBLEM — F10.90 ALCOHOL USE: Status: ACTIVE | Noted: 2023-11-13

## 2024-12-26 LAB — BACTERIA UR CULT: NORMAL

## 2025-04-26 ENCOUNTER — NON-APPOINTMENT (OUTPATIENT)
Age: 55
End: 2025-04-26

## 2025-06-02 ENCOUNTER — APPOINTMENT (OUTPATIENT)
Dept: UROGYNECOLOGY | Facility: CLINIC | Age: 55
End: 2025-06-02

## 2025-08-18 ENCOUNTER — APPOINTMENT (OUTPATIENT)
Dept: UROGYNECOLOGY | Facility: CLINIC | Age: 55
End: 2025-08-18
Payer: COMMERCIAL

## 2025-08-18 DIAGNOSIS — N81.10 CYSTOCELE, UNSPECIFIED: ICD-10-CM

## 2025-08-18 DIAGNOSIS — Z90.710 ACQUIRED ABSENCE OF BOTH CERVIX AND UTERUS: ICD-10-CM

## 2025-08-18 DIAGNOSIS — N81.6 RECTOCELE: ICD-10-CM

## 2025-08-18 PROCEDURE — 99213 OFFICE O/P EST LOW 20 MIN: CPT
